# Patient Record
Sex: FEMALE | Race: WHITE | NOT HISPANIC OR LATINO | URBAN - METROPOLITAN AREA
[De-identification: names, ages, dates, MRNs, and addresses within clinical notes are randomized per-mention and may not be internally consistent; named-entity substitution may affect disease eponyms.]

---

## 2019-12-10 ENCOUNTER — OFFICE (OUTPATIENT)
Dept: URBAN - METROPOLITAN AREA CLINIC 33 | Facility: CLINIC | Age: 54
End: 2019-12-10

## 2019-12-10 VITALS
HEART RATE: 74 BPM | HEIGHT: 61 IN | DIASTOLIC BLOOD PRESSURE: 112 MMHG | WEIGHT: 179 LBS | SYSTOLIC BLOOD PRESSURE: 151 MMHG | TEMPERATURE: 97.3 F

## 2019-12-10 DIAGNOSIS — K21.9 GASTRO-ESOPHAGEAL REFLUX DISEASE WITHOUT ESOPHAGITIS: ICD-10-CM

## 2019-12-10 DIAGNOSIS — R93.3 ABNORMAL FINDINGS ON DIAGNOSTIC IMAGING OF OTHER PARTS OF DI: ICD-10-CM

## 2019-12-10 DIAGNOSIS — R13.10 DYSPHAGIA, UNSPECIFIED: ICD-10-CM

## 2019-12-10 PROCEDURE — 99244 OFF/OP CNSLTJ NEW/EST MOD 40: CPT

## 2019-12-10 NOTE — SERVICEHPINOTES
OSWALD FIELDS   is a   53   year old    female who is being seen in consultation at the request of   TU KERR   for dysphagia. She underwent a BS which showed esophageal dysmotility. She doesn't smoke or drink ETOH. She denies chest pain, SOB, constipation, diarrhea, ab pain, rectal bleeding, and weight loss. She is having thyroid levels monitored by an endocrinologist who recently started medication but then stopped it. He said she could go back on the medication if it made her feel better. She has environmental allergies and just started shots for this, no known food allergies. She has coughed "forever". She will cough and then bring food back up. She can have difficulty swallowing both foods and liquids symptoms present x 8 months but getting worse. She has acid reflux and is taking Nexium which does help. No other UGI issues. No known heart issues.

## 2019-12-12 ENCOUNTER — OFFICE (OUTPATIENT)
Dept: URBAN - METROPOLITAN AREA CLINIC 32 | Facility: CLINIC | Age: 54
End: 2019-12-12

## 2019-12-12 ENCOUNTER — OFFICE (OUTPATIENT)
Dept: URBAN - METROPOLITAN AREA PATHOLOGY 17 | Facility: PATHOLOGY | Age: 54
End: 2019-12-12

## 2019-12-12 VITALS
HEART RATE: 79 BPM | RESPIRATION RATE: 15 BRPM | DIASTOLIC BLOOD PRESSURE: 105 MMHG | WEIGHT: 179 LBS | SYSTOLIC BLOOD PRESSURE: 140 MMHG | RESPIRATION RATE: 16 BRPM | SYSTOLIC BLOOD PRESSURE: 129 MMHG | OXYGEN SATURATION: 97 % | HEART RATE: 99 BPM | HEIGHT: 61 IN | HEART RATE: 97 BPM | SYSTOLIC BLOOD PRESSURE: 132 MMHG | OXYGEN SATURATION: 100 % | RESPIRATION RATE: 17 BRPM | SYSTOLIC BLOOD PRESSURE: 166 MMHG | DIASTOLIC BLOOD PRESSURE: 98 MMHG | RESPIRATION RATE: 18 BRPM | TEMPERATURE: 97.9 F | OXYGEN SATURATION: 96 % | OXYGEN SATURATION: 94 % | HEART RATE: 67 BPM | DIASTOLIC BLOOD PRESSURE: 96 MMHG | HEART RATE: 87 BPM | TEMPERATURE: 97.5 F

## 2019-12-12 DIAGNOSIS — K21.9 GASTRO-ESOPHAGEAL REFLUX DISEASE WITHOUT ESOPHAGITIS: ICD-10-CM

## 2019-12-12 DIAGNOSIS — R93.3 ABNORMAL FINDINGS ON DIAGNOSTIC IMAGING OF OTHER PARTS OF DI: ICD-10-CM

## 2019-12-12 DIAGNOSIS — R13.10 DYSPHAGIA, UNSPECIFIED: ICD-10-CM

## 2019-12-12 DIAGNOSIS — K29.60 OTHER GASTRITIS WITHOUT BLEEDING: ICD-10-CM

## 2019-12-12 PROBLEM — K29.70 GASTRITIS, UNSPECIFIED, WITHOUT BLEEDING: Status: ACTIVE | Noted: 2019-12-12

## 2019-12-12 PROBLEM — K44.9 DIAPHRAGMATIC HERNIA WITHOUT OBSTRUCTION OR GANGRENE: Status: ACTIVE | Noted: 2019-12-12

## 2019-12-12 PROCEDURE — 43450 DILATE ESOPHAGUS 1/MULT PASS: CPT

## 2019-12-12 PROCEDURE — 43239 EGD BIOPSY SINGLE/MULTIPLE: CPT

## 2019-12-12 PROCEDURE — 88305 TISSUE EXAM BY PATHOLOGIST: CPT

## 2019-12-12 PROCEDURE — 88342 IMHCHEM/IMCYTCHM 1ST ANTB: CPT

## 2019-12-12 PROCEDURE — 88313 SPECIAL STAINS GROUP 2: CPT

## 2019-12-12 PROCEDURE — 88312 SPECIAL STAINS GROUP 1: CPT

## 2020-01-17 ENCOUNTER — OFFICE (OUTPATIENT)
Dept: URBAN - METROPOLITAN AREA CLINIC 33 | Facility: CLINIC | Age: 55
End: 2020-01-17

## 2020-01-17 VITALS
DIASTOLIC BLOOD PRESSURE: 90 MMHG | HEIGHT: 61 IN | HEART RATE: 70 BPM | WEIGHT: 180 LBS | SYSTOLIC BLOOD PRESSURE: 130 MMHG | TEMPERATURE: 96.8 F

## 2020-01-17 DIAGNOSIS — R13.10 DYSPHAGIA, UNSPECIFIED: ICD-10-CM

## 2020-01-17 DIAGNOSIS — K21.9 GASTRO-ESOPHAGEAL REFLUX DISEASE WITHOUT ESOPHAGITIS: ICD-10-CM

## 2020-01-17 PROCEDURE — 99214 OFFICE O/P EST MOD 30 MIN: CPT

## 2020-01-17 NOTE — SERVICEHPINOTES
OSWALD FIELDS   is a   54  female who presents for EGD follow up. EGD 12/12/19 revealed mild gastritis, reflux changes in esophagus (neg for eosinophils).BRSince started pantoprazole 40mg after EGD (3 weeks ago). Dysphagia has greatly improved. Rx was for BID but she has only been taking once daily with great results. Was on Nexium intermittently for 3 months prior to EGD but not daily. denies breakthrough sx. no nocturnal GERD.

## 2022-11-18 ENCOUNTER — OFFICE (OUTPATIENT)
Dept: URBAN - METROPOLITAN AREA CLINIC 79 | Facility: CLINIC | Age: 57
End: 2022-11-18

## 2022-11-18 VITALS
HEIGHT: 61 IN | WEIGHT: 179 LBS | HEART RATE: 70 BPM | DIASTOLIC BLOOD PRESSURE: 103 MMHG | TEMPERATURE: 97.3 F | SYSTOLIC BLOOD PRESSURE: 147 MMHG

## 2022-11-18 DIAGNOSIS — K59.09 OTHER CONSTIPATION: ICD-10-CM

## 2022-11-18 DIAGNOSIS — R12 HEARTBURN: ICD-10-CM

## 2022-11-18 DIAGNOSIS — R10.12 LEFT UPPER QUADRANT PAIN: ICD-10-CM

## 2022-11-18 DIAGNOSIS — R13.10 DYSPHAGIA, UNSPECIFIED: ICD-10-CM

## 2022-11-18 PROCEDURE — 99214 OFFICE O/P EST MOD 30 MIN: CPT | Performed by: PHYSICIAN ASSISTANT

## 2022-11-18 PROCEDURE — 00031: CPT | Performed by: INTERNAL MEDICINE

## 2022-11-18 NOTE — SERVICEHPINOTES
pt is here to discuss abdominal pain for about six months ago. She developed pain in the LUQ that radiated towards the epigastrium. She started Omeprazole which helped her pain a lot but she has since ran out of the PPI. She has a chronic cough which was also better on the PPI. She will regurgitate up food and she has dysphagia. No nausea or vomiting and no melena. No unexplained weight loss. She had dysphagia in the past which resolved with taking a PPI--symptoms likely d/t reflux esophagitis. EGD in 2019 showed mild gastritis and mild reflux changes.  No abuse of NSAIDS. No tobacco or ETOH use. 
sunita dorsey 
Her last colonoscopy was between 5-10 yrs ago. She has chronic constipation present for about 5 yrs. Has a BM every other day--they are incomplete. She takes senna at times which helps. No blood in the stool or upon wiping. No family hx of CRC. 
sunita Early known heart issues. No problems climbing stairs--no SOB or chest pain. sunita dorsey

## 2023-01-09 ENCOUNTER — OFFICE (OUTPATIENT)
Dept: URBAN - METROPOLITAN AREA CLINIC 34 | Facility: CLINIC | Age: 58
End: 2023-01-09

## 2023-01-09 PROCEDURE — 00031: CPT | Performed by: INTERNAL MEDICINE

## 2023-01-12 ENCOUNTER — OFFICE (OUTPATIENT)
Dept: URBAN - METROPOLITAN AREA PATHOLOGY 18 | Facility: PATHOLOGY | Age: 58
End: 2023-01-12

## 2023-01-12 ENCOUNTER — OFFICE (OUTPATIENT)
Dept: URBAN - METROPOLITAN AREA CLINIC 30 | Facility: CLINIC | Age: 58
End: 2023-01-12

## 2023-01-12 VITALS
RESPIRATION RATE: 14 BRPM | TEMPERATURE: 98.2 F | DIASTOLIC BLOOD PRESSURE: 81 MMHG | DIASTOLIC BLOOD PRESSURE: 66 MMHG | HEART RATE: 89 BPM | HEART RATE: 79 BPM | OXYGEN SATURATION: 94 % | HEART RATE: 94 BPM | WEIGHT: 171 LBS | SYSTOLIC BLOOD PRESSURE: 124 MMHG | DIASTOLIC BLOOD PRESSURE: 73 MMHG | OXYGEN SATURATION: 96 % | OXYGEN SATURATION: 95 % | DIASTOLIC BLOOD PRESSURE: 76 MMHG | RESPIRATION RATE: 22 BRPM | OXYGEN SATURATION: 98 % | DIASTOLIC BLOOD PRESSURE: 80 MMHG | SYSTOLIC BLOOD PRESSURE: 144 MMHG | SYSTOLIC BLOOD PRESSURE: 138 MMHG | OXYGEN SATURATION: 97 % | HEART RATE: 84 BPM | RESPIRATION RATE: 10 BRPM | SYSTOLIC BLOOD PRESSURE: 119 MMHG | DIASTOLIC BLOOD PRESSURE: 89 MMHG | SYSTOLIC BLOOD PRESSURE: 129 MMHG | SYSTOLIC BLOOD PRESSURE: 130 MMHG | TEMPERATURE: 97 F | RESPIRATION RATE: 27 BRPM | SYSTOLIC BLOOD PRESSURE: 118 MMHG | HEART RATE: 90 BPM | RESPIRATION RATE: 20 BRPM | DIASTOLIC BLOOD PRESSURE: 85 MMHG | OXYGEN SATURATION: 100 % | HEIGHT: 61 IN | HEART RATE: 78 BPM

## 2023-01-12 DIAGNOSIS — R10.12 LEFT UPPER QUADRANT PAIN: ICD-10-CM

## 2023-01-12 DIAGNOSIS — K59.09 OTHER CONSTIPATION: ICD-10-CM

## 2023-01-12 DIAGNOSIS — K21.9 GASTRO-ESOPHAGEAL REFLUX DISEASE WITHOUT ESOPHAGITIS: ICD-10-CM

## 2023-01-12 DIAGNOSIS — R13.10 DYSPHAGIA, UNSPECIFIED: ICD-10-CM

## 2023-01-12 PROBLEM — K31.89 OTHER DISEASES OF STOMACH AND DUODENUM: Status: ACTIVE | Noted: 2023-01-12

## 2023-01-12 PROCEDURE — 88313 SPECIAL STAINS GROUP 2: CPT | Performed by: PATHOLOGY

## 2023-01-12 PROCEDURE — 88305 TISSUE EXAM BY PATHOLOGIST: CPT | Performed by: PATHOLOGY

## 2023-01-12 PROCEDURE — 88342 IMHCHEM/IMCYTCHM 1ST ANTB: CPT | Performed by: PATHOLOGY

## 2023-01-12 PROCEDURE — 88312 SPECIAL STAINS GROUP 1: CPT | Performed by: PATHOLOGY

## 2023-05-10 ENCOUNTER — OFFICE (OUTPATIENT)
Dept: URBAN - METROPOLITAN AREA CLINIC 79 | Facility: CLINIC | Age: 58
End: 2023-05-10

## 2023-05-10 VITALS
TEMPERATURE: 97 F | HEART RATE: 67 BPM | WEIGHT: 170 LBS | DIASTOLIC BLOOD PRESSURE: 95 MMHG | HEIGHT: 61 IN | SYSTOLIC BLOOD PRESSURE: 163 MMHG

## 2023-05-10 DIAGNOSIS — R10.10 UPPER ABDOMINAL PAIN, UNSPECIFIED: ICD-10-CM

## 2023-05-10 DIAGNOSIS — R14.0 ABDOMINAL DISTENSION (GASEOUS): ICD-10-CM

## 2023-05-10 PROCEDURE — 99214 OFFICE O/P EST MOD 30 MIN: CPT | Performed by: PHYSICIAN ASSISTANT

## 2023-05-10 NOTE — SERVICEHPINOTES
58 yo female presents with upper abdominal pains intermittently over the past year, but worse in the past 2 months. Pain is across upper abdomen. Sometimes she can wake up in the morning with the pain. She has some water and then often has eggs or yogurt and fruit for breakfast. She might have a cup of coffee has stopped using cream. She tried cutting back on dairy and is not sure this was helpful. She is wondering about food sensitivities and would like to have testing.sunita dorsey Pain may or may not last all day. Some days it starts later in the day. She is not sure what is triggering her symptoms. 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 For the past couple of weeks. she has been using a heating pad every night at bedtime. She also notes a h/o broken sternum from a fall 2 years ago (also had a concussion). 
sunita dorsey She reports an U/S ordered by PCP within the past 6 months or so. sunita Lowemorgan is on omeprazole 40 mg for control of GERD. She tends toward mild constipation, but normally has a daily BM. sunita dorsey
She had EGD and colonoscopy this year with benign findings. sunita

## 2023-06-28 ENCOUNTER — OFFICE (OUTPATIENT)
Dept: URBAN - METROPOLITAN AREA CLINIC 79 | Facility: CLINIC | Age: 58
End: 2023-06-28

## 2023-06-28 VITALS
HEART RATE: 63 BPM | WEIGHT: 172 LBS | SYSTOLIC BLOOD PRESSURE: 124 MMHG | HEIGHT: 61 IN | DIASTOLIC BLOOD PRESSURE: 85 MMHG | TEMPERATURE: 97.6 F

## 2023-06-28 DIAGNOSIS — R10.10 UPPER ABDOMINAL PAIN, UNSPECIFIED: ICD-10-CM

## 2023-06-28 DIAGNOSIS — R14.0 ABDOMINAL DISTENSION (GASEOUS): ICD-10-CM

## 2023-06-28 PROCEDURE — 99214 OFFICE O/P EST MOD 30 MIN: CPT | Performed by: PHYSICIAN ASSISTANT

## 2023-06-28 NOTE — SERVICEHPINOTES
58 yo female presents with her daughter for f/u upper abdominal pains intermittently over the past year,. Pain is across upper abdomen. She has been tracking her symptoms and has found that cutting out gluten and coffee has helped her quite a bit. She recently had more pain again/bloating again after having some food at [employer]. 
sunita dorsey She is wondering about food sensitivities and would like to have testing - tried to do the Food Inflammation Test but was unable to get enough blood from the finger prick so she is wondering about a blood draw option.
sunita dorsey Abdominal pain can happen at different times of day and may or may not last all day. Some days it starts later in the day. 
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 She also notes a h/o broken sternum from a fall 2 years ago (also had a concussion). She reports an U/S ordered by PCP within the past 6 months or so. Also just had some bloodwork done - generally ok. She is on omeprazole 40 mg for control of GERD.She tends toward mild constipation, but normally has a daily BM. She had EGD and colonoscopy this year with benign findings.
sunita dorsey She is planning to have a CT scan soon - ordered by her PCP. sunita